# Patient Record
Sex: FEMALE | Race: WHITE | NOT HISPANIC OR LATINO | Employment: OTHER | URBAN - METROPOLITAN AREA
[De-identification: names, ages, dates, MRNs, and addresses within clinical notes are randomized per-mention and may not be internally consistent; named-entity substitution may affect disease eponyms.]

---

## 2017-10-25 ENCOUNTER — TRANSCRIBE ORDERS (OUTPATIENT)
Dept: ADMINISTRATIVE | Facility: HOSPITAL | Age: 67
End: 2017-10-25

## 2017-10-25 DIAGNOSIS — Z12.39 BREAST SCREENING: Primary | ICD-10-CM

## 2017-11-17 ENCOUNTER — HOSPITAL ENCOUNTER (OUTPATIENT)
Dept: GENERAL RADIOLOGY | Facility: HOSPITAL | Age: 67
Discharge: HOME | End: 2017-11-17

## 2017-11-17 ENCOUNTER — HOSPITAL ENCOUNTER (OUTPATIENT)
Dept: MAMMOGRAPHY | Facility: HOSPITAL | Age: 67
Discharge: HOME | End: 2017-11-17
Admitting: FAMILY MEDICINE

## 2017-11-17 DIAGNOSIS — Z12.39 BREAST SCREENING: ICD-10-CM

## 2017-11-17 DIAGNOSIS — Z00.01 ENCOUNTER FOR GENERAL ADULT MEDICAL EXAMINATION WITH ABNORMAL FINDINGS: ICD-10-CM

## 2017-11-17 PROCEDURE — G0202 SCR MAMMO BI INCL CAD: HCPCS

## 2017-11-17 PROCEDURE — 71020 HC CHEST PA AND LATERAL: CPT

## 2018-03-30 ENCOUNTER — OFFICE VISIT (OUTPATIENT)
Dept: NEUROLOGY | Facility: CLINIC | Age: 68
End: 2018-03-30

## 2018-03-30 VITALS
SYSTOLIC BLOOD PRESSURE: 141 MMHG | OXYGEN SATURATION: 98 % | WEIGHT: 131 LBS | HEIGHT: 63 IN | BODY MASS INDEX: 23.21 KG/M2 | DIASTOLIC BLOOD PRESSURE: 88 MMHG | HEART RATE: 99 BPM

## 2018-03-30 DIAGNOSIS — G60.9 IDIOPATHIC PERIPHERAL NEUROPATHY: Primary | ICD-10-CM

## 2018-03-30 DIAGNOSIS — H91.92 HEARING LOSS OF LEFT EAR, UNSPECIFIED HEARING LOSS TYPE: ICD-10-CM

## 2018-03-30 DIAGNOSIS — R27.0 ATAXIA: ICD-10-CM

## 2018-03-30 DIAGNOSIS — R73.09 OTHER ABNORMAL GLUCOSE: ICD-10-CM

## 2018-03-30 PROBLEM — IMO0001 BLUES: Status: ACTIVE | Noted: 2018-03-30

## 2018-03-30 PROBLEM — R92.8 ABNORMAL FINDING ON MAMMOGRAPHY: Status: ACTIVE | Noted: 2018-03-30

## 2018-03-30 PROBLEM — G58.9 NERVE DISORDER: Status: ACTIVE | Noted: 2018-03-30

## 2018-03-30 PROBLEM — E78.5 HLD (HYPERLIPIDEMIA): Status: ACTIVE | Noted: 2018-03-30

## 2018-03-30 PROBLEM — M19.90 ARTHRITIS: Status: ACTIVE | Noted: 2018-03-30

## 2018-03-30 PROCEDURE — 99205 OFFICE O/P NEW HI 60 MIN: CPT | Performed by: PSYCHIATRY & NEUROLOGY

## 2018-03-30 RX ORDER — TRAZODONE HYDROCHLORIDE 100 MG/1
100 TABLET ORAL NIGHTLY
COMMUNITY

## 2018-03-30 RX ORDER — MORPHINE SULFATE 15 MG/1
15 TABLET ORAL EVERY 4 HOURS PRN
COMMUNITY

## 2018-03-30 RX ORDER — FEXOFENADINE HCL AND PSEUDOEPHEDRINE HCI 180; 240 MG/1; MG/1
TABLET, EXTENDED RELEASE ORAL
COMMUNITY
Start: 2014-07-29

## 2018-03-30 NOTE — PROGRESS NOTES
CC:CMT    HPI:  Sia Rodgers is a  67 y.o.  right-handed white female who was sent for neurologic consultation by Dr. Sellers regarding CMT.  She states that she was told since she was a child because of the way her feet look that she had CMT.  Her father and grandfather and paternal uncle were all diagnosed in some way with CMT.  She is not sure how they were diagnosed if it was by physical exam or any other tests were done.  She states that she had an EMG when evaluating her carpal tunnel syndrome.  It was done a long time ago back in the 1990s.  The report is not available.  She states that her daughter also has and she had an evaluation in [place] but that evaluation has not yet been available to her.  She describes high arches and development of hammertoes.  She also describes a history of a right first CMC repair but no history of carpal tunnel releases.  She has history of major scoliosis of the lumbar and thoracic spine and she has had 5 major spinal operations including multilevel chaka placements in the thoracic spine and an unusual procedure in the lumbar spine with cords instead of rods.  She states that she no longer has substantial pain in the legs from the low back that she had before surgery.    She has osteoarthritis.  She sees Dr. Robbie Aleman and extensive testing apparently did not disclose an inflammatory arthritic condition.    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  The toe might get slightly pale.    As an additional symptom she describes some balance trouble without dizziness.  This has developed over time.  She also has had loss of hearing in the left ear.  This has not been investigated.  She does have some  bladder symptoms and she calls his old lady bladder where she has urinary frequency and some retention possibly a little bit of leaking when coughing.    Past Medical History:   Diagnosis Date   • Depression    • Low back pain    • Neck pain    • Osteoarthritis          Past Surgical History:   Procedure Laterality Date   • APPENDECTOMY     • BACK SURGERY     • BREAST BIOPSY     • EPIDURAL BLOCK     • HAND SURGERY     • TONSILLECTOMY             Current Outpatient Prescriptions:   •  amphetamine-dextroamphetamine (ADDERALL) 15 MG tablet, , Disp: , Rfl:   •  amphetamine-dextroamphetamine (ADDERALL) 30 MG tablet, , Disp: , Rfl:   •  baclofen (LIORESAL) 10 MG tablet, TAKE ONE (1) TABLET BY MOUTH UP TO THREE TIMES DAILY AS NEEDED, Disp: , Rfl: 0  •  buPROPion XL (WELLBUTRIN XL) 300 MG 24 hr tablet, , Disp: , Rfl:   •  Calcium-Magnesium-Vitamin D (CITRACAL CALCIUM+D PO), Take 1,200 mg by mouth Daily., Disp: , Rfl:   •  celecoxib (CeleBREX) 200 MG capsule, TAKE ONE (1) CAPSULE BY MOUTH EVERY DAY, Disp: , Rfl: 1  •  fexofenadine-pseudoephedrine (ALLEGRA-D 24 HOUR) 180-240 MG per 24 hr tablet, Take  by mouth., Disp: , Rfl:   •  fluticasone (CUTIVATE) 0.05 % cream, , Disp: , Rfl:   •  Morphine (MSIR) 15 MG tablet, Take 15 mg by mouth Every 4 (Four) Hours As Needed for Severe Pain ., Disp: , Rfl:   •  OXYCONTIN 60 MG 12 hr tablet, TAKE TWO (2) TABLETS BY MOUTH EVERY MORNING AND ONE (1) EVERY EVENING, Disp: , Rfl: 0  •  simvastatin (ZOCOR) 40 MG tablet, , Disp: , Rfl:   •  SPIRIVA RESPIMAT 1.25 MCG/ACT aerosol solution, , Disp: , Rfl:   •  traZODone (DESYREL) 100 MG tablet, Take 100 mg by mouth Every Night., Disp: , Rfl:       Family History   Problem Relation Age of Onset   • Dementia Mother    • Arthritis Mother    • Alzheimer's disease Father    • Dementia Father    • No Known Problems Sister    • No Known Problems Brother    • Migraines Daughter    • No Known Problems Son    • Arthritis Maternal Grandmother    • No Known  Problems Paternal Grandmother    • No Known Problems Maternal Aunt    • No Known Problems Paternal Aunt    • BRCA 1/2 Neg Hx    • Breast cancer Neg Hx    • Colon cancer Neg Hx    • Endometrial cancer Neg Hx    • Ovarian cancer Neg Hx          Social History     Social History   • Marital status: Unknown     Spouse name: N/A   • Number of children: N/A   • Years of education: N/A     Occupational History   • Not on file.     Social History Main Topics   • Smoking status: Current Every Day Smoker     Packs/day: 0.50   • Smokeless tobacco: Never Used   • Alcohol use 0.6 oz/week     1 Shots of liquor per week      Comment: socially   • Drug use: No   • Sexual activity: Not on file     Other Topics Concern   • Not on file     Social History Narrative   • No narrative on file         No Known Allergies      Pain Scale:4/10        ROS:  Review of Systems   Constitutional: Positive for fatigue. Negative for activity change, appetite change, chills, diaphoresis, fever and unexpected weight change.   HENT: Positive for congestion, hearing loss and sinus pressure. Negative for drooling, tinnitus, trouble swallowing and voice change.    Eyes: Positive for pain (dry eyes, both eyes), redness (dry eyes, both eyes) and visual disturbance (strabismus). Negative for photophobia.   Respiratory: Positive for shortness of breath. Negative for chest tightness.    Cardiovascular: Positive for leg swelling (both legs). Negative for chest pain and palpitations.   Gastrointestinal: Negative for blood in stool and vomiting.   Endocrine: Negative for cold intolerance and heat intolerance.   Genitourinary: Positive for difficulty urinating and urgency.   Musculoskeletal: Positive for back pain, gait problem, joint swelling, neck pain and neck stiffness.   Skin: Negative for rash.   Neurological: Positive for dizziness, tremors (hands shake) and numbness. Negative for seizures, syncope, facial asymmetry, speech difficulty, weakness,  "light-headedness and headaches.   Hematological: Does not bruise/bleed easily.   Psychiatric/Behavioral: Positive for behavioral problems (depression). Negative for confusion, hallucinations, sleep disturbance and suicidal ideas. The patient is not nervous/anxious.            Physical Exam:  Vitals:    03/30/18 1302   BP: 141/88   BP Location: Left arm   Patient Position: Sitting   Cuff Size: Adult   Pulse: 99   SpO2: 98%   Weight: 59.4 kg (131 lb)   Height: 160 cm (62.99\")     Body mass index is 23.21 kg/m².    Physical Exam  Gen.: Alert white female no acute distress  HEENT: Normocephalic no evidence of trauma.  Discs flat.  No A-V nicking.  Throat negative.  Neck: Supple.  No thyromegaly.  No cervical bruits.  Radial pulses were strong and simultaneous.  Heart: Regular rate and rhythm without murmurs  Examination lower extremities show high arches and some hammertoes    Neurological Exam:   Mental status: Awake alert oriented and conversant without evidence of an affective disorder thought disorder delusions or hallucinations.  HCF: No aphasia or apraxia or dysarthria.  Recent and remote memory intact.  Knowledge of recent events intact.  Cranial nerves: 2-12 intact except noticeably reduced hearing in the left ear to finger rub which was absent  Motor: Normal tone in the arms and legs.  She has bilateral intrinsic hand weakness with significant atrophy of the right abductor pollicis brevis and milder atrophy of the left and of the interossei.  There was some question of finger and thumb extensor weakness bilaterally.  Remaining muscles tested in both arms were 5 over 5.  In the lower extremities there is mild weakness of the tibialis anterior muscles and toe extensors and to some degree the toe flexors.  Reflexes: Diffusely present including ankle jerks with downgoing toe signs however the right knee jerk appeared brisker than the left  Sensory: Patchy reduced light touch and pinprick in the feet.  Minimally so " in the hands to light touch but pinprick was sharp.  Vibration sense was probably reduced at the ankle with position sense intact in the great toes.  Cerebellar: Finger to nose rapid movement heel-to-shin was normal  Gait and Station: Casual walk and toe walk looked pretty normal.  She had a little trouble tandem walking but it wasn't bad.  She had a little trouble heel walking probably due to tibialis weakness no Romberg no drift        Results:      Lab Results   Component Value Date    GLUCOSE 92 08/08/2014    BUN 16 08/08/2014    CREATININE 0.67 08/08/2014    CO2 30 (H) 08/08/2014    CALCIUM 9.6 08/08/2014       Lab Results   Component Value Date    WBC 6.98 08/08/2014    HGB 15.1 08/08/2014    HCT 45.1 08/08/2014    MCV 91.5 08/08/2014     08/08/2014         .No results found for: RPR      No results found for: TSH, S5JUXSQ, T8YQWYP, THYROIDAB      No results found for: CABNDVFB50      No results found for: FOLATE      No results found for: HGBA1C      Prior x-rays of the thoracic and lumbar spine which she brought on film and I reviewed.  She has severe scoliosis with multilevel chaka placement in the thoracic spine and pedicle screws in the lumbar spine without connections radiographically.  She states that there are cords holding these together.  Prior x-rays of the cervical spine were also reviewed on film showing multilevel severe degenerative disc disease with spurring      Assessment:   1.  Moderate to severe peripheral neuropathy.  Based on the patient's family history of most likely represents a form of Charcot-Gabby-Tooth disease.  2.  Severe scoliosis status post 5 thoracic/ lumbar operations  3.  Severe degenerative disc disease of the spine and prominent osteoarthritis multiple joints  4.  Some reflex asymmetry in the knees without any additional features to suggest a cause.  No clear evidence on exam for a myelopathy  5.  Complaints of gait unsteadiness with progressive hearing loss in the  left ear-may be independent but an acoustic neuroma should be excluded    Plan:  1.  I have asked her to obtain records of her daughter that demonstrate that she has CMT  2.  Labs to screen for common treatable causes of peripheral neuropathy: ESR, RPR, B12 and folate, thyroid functions, SPEP and IEP, cryoglobulins, heavy metals, copper level  III.  Will ask for help looking for old EMG from about 1995 which she states was done here.  If not helpful the option is to repeat her EMG  4.  Follow-up in about a month with me or Inocente Haddad NP  5.  MRI of the brain without and with contrast looking for an acoustic neuroma left ear        At least 30 minutes of this 60 minute consult were spent counseling the patient on multiple diagnoses and evaluations thereof      4/13/18  The patient calls with information on her daughters hereditary neuropathy.  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  The patient would like to be tested.  Order for HNPP panel placed.  Gns          Dictated utilizing Dragon dictation.

## 2018-04-13 ENCOUNTER — HOSPITAL ENCOUNTER (OUTPATIENT)
Dept: MRI IMAGING | Facility: HOSPITAL | Age: 68
Discharge: HOME | End: 2018-04-13
Attending: PSYCHIATRY & NEUROLOGY | Admitting: PSYCHIATRY & NEUROLOGY

## 2018-04-13 DIAGNOSIS — R27.0 ATAXIA: ICD-10-CM

## 2018-04-13 DIAGNOSIS — G60.9 IDIOPATHIC PERIPHERAL NEUROPATHY: Primary | ICD-10-CM

## 2018-04-13 PROCEDURE — 70553 MRI BRAIN STEM W/O & W/DYE: CPT

## 2018-04-13 PROCEDURE — 82565 ASSAY OF CREATININE: CPT

## 2018-04-13 PROCEDURE — A9577 INJ MULTIHANCE: HCPCS | Performed by: PSYCHIATRY & NEUROLOGY

## 2018-04-13 PROCEDURE — 0 GADOBENATE DIMEGLUMINE 529 MG/ML SOLUTION: Performed by: PSYCHIATRY & NEUROLOGY

## 2018-04-13 RX ADMIN — GADOBENATE DIMEGLUMINE 11 ML: 529 INJECTION, SOLUTION INTRAVENOUS at 16:00

## 2018-04-14 LAB — CREAT BLDA-MCNC: 0.7 MG/DL (ref 0.6–1.3)

## 2018-04-23 ENCOUNTER — TELEPHONE (OUTPATIENT)
Dept: NEUROLOGY | Facility: CLINIC | Age: 68
End: 2018-04-23

## 2018-04-23 NOTE — TELEPHONE ENCOUNTER
----- Message from Arvind Garnett MD sent at 4/21/2018  2:30 PM EDT -----  Contact: [phone]  I placed the lab for HNPP when I saw this a few days ago.    gns  ----- Message -----  From: Shanika Sorensen MA  Sent: 4/20/2018   5:12 PM  To: Arvind Garnett MD        ----- Message -----  From: Tone Jamil  Sent: 4/18/2018   3:12 PM  To: Shanika Sorensen MA    Pt stated that she called last week about having an additional lab drawn? She was told she would get a call the next day but she has not heard anything. I asked her what test it was and she could not remember

## 2018-04-25 ENCOUNTER — TRANSCRIBE ORDERS (OUTPATIENT)
Dept: ADMINISTRATIVE | Facility: HOSPITAL | Age: 68
End: 2018-04-25

## 2018-04-25 DIAGNOSIS — N39.41 URGE INCONTINENCE: Primary | ICD-10-CM

## 2018-04-30 ENCOUNTER — APPOINTMENT (OUTPATIENT)
Dept: LAB | Facility: HOSPITAL | Age: 68
End: 2018-04-30

## 2018-04-30 ENCOUNTER — HOSPITAL ENCOUNTER (OUTPATIENT)
Dept: ULTRASOUND IMAGING | Facility: HOSPITAL | Age: 68
Discharge: HOME | End: 2018-04-30
Attending: UROLOGY | Admitting: UROLOGY

## 2018-04-30 DIAGNOSIS — N39.41 URGE INCONTINENCE: ICD-10-CM

## 2018-04-30 LAB
ERYTHROCYTE [SEDIMENTATION RATE] IN BLOOD: 4 MM/HR (ref 0–30)
FOLATE SERPL-MCNC: >20 NG/ML (ref 4.78–24.2)
HBA1C MFR BLD: 5.5 % (ref 4.8–5.6)
T4 FREE SERPL-MCNC: 1.47 NG/DL (ref 0.93–1.7)
TSH SERPL DL<=0.05 MIU/L-ACNC: 3.81 MIU/ML (ref 0.27–4.2)
VIT B12 BLD-MCNC: 375 PG/ML (ref 211–946)

## 2018-04-30 PROCEDURE — 82175 ASSAY OF ARSENIC: CPT | Performed by: PSYCHIATRY & NEUROLOGY

## 2018-04-30 PROCEDURE — 83036 HEMOGLOBIN GLYCOSYLATED A1C: CPT | Performed by: PSYCHIATRY & NEUROLOGY

## 2018-04-30 PROCEDURE — 86592 SYPHILIS TEST NON-TREP QUAL: CPT | Performed by: PSYCHIATRY & NEUROLOGY

## 2018-04-30 PROCEDURE — 82607 VITAMIN B-12: CPT | Performed by: PSYCHIATRY & NEUROLOGY

## 2018-04-30 PROCEDURE — 83655 ASSAY OF LEAD: CPT | Performed by: PSYCHIATRY & NEUROLOGY

## 2018-04-30 PROCEDURE — 84165 PROTEIN E-PHORESIS SERUM: CPT | Performed by: PSYCHIATRY & NEUROLOGY

## 2018-04-30 PROCEDURE — 84443 ASSAY THYROID STIM HORMONE: CPT | Performed by: PSYCHIATRY & NEUROLOGY

## 2018-04-30 PROCEDURE — 76775 US EXAM ABDO BACK WALL LIM: CPT

## 2018-04-30 PROCEDURE — 85652 RBC SED RATE AUTOMATED: CPT | Performed by: PSYCHIATRY & NEUROLOGY

## 2018-04-30 PROCEDURE — 82746 ASSAY OF FOLIC ACID SERUM: CPT | Performed by: PSYCHIATRY & NEUROLOGY

## 2018-04-30 PROCEDURE — 36415 COLL VENOUS BLD VENIPUNCTURE: CPT | Performed by: PSYCHIATRY & NEUROLOGY

## 2018-04-30 PROCEDURE — 84439 ASSAY OF FREE THYROXINE: CPT | Performed by: PSYCHIATRY & NEUROLOGY

## 2018-04-30 PROCEDURE — 83825 ASSAY OF MERCURY: CPT | Performed by: PSYCHIATRY & NEUROLOGY

## 2018-04-30 PROCEDURE — 84155 ASSAY OF PROTEIN SERUM: CPT | Performed by: PSYCHIATRY & NEUROLOGY

## 2018-05-01 LAB — RPR SER QL: NORMAL

## 2018-05-02 LAB
ALBUMIN SERPL-MCNC: 4.1 G/DL (ref 2.9–4.4)
ALBUMIN/GLOB SERPL: 2 {RATIO} (ref 0.7–1.7)
ALPHA1 GLOB FLD ELPH-MCNC: 0.2 G/DL (ref 0–0.4)
ALPHA2 GLOB SERPL ELPH-MCNC: 0.6 G/DL (ref 0.4–1)
B-GLOBULIN SERPL ELPH-MCNC: 0.8 G/DL (ref 0.7–1.3)
GAMMA GLOB SERPL ELPH-MCNC: 0.5 G/DL (ref 0.4–1.8)
GLOBULIN SER CALC-MCNC: 2.1 G/DL (ref 2.2–3.9)
Lab: ABNORMAL
M-SPIKE: ABNORMAL G/DL
PROT PATTERN SERPL ELPH-IMP: ABNORMAL
PROT SERPL-MCNC: 6.2 G/DL (ref 6–8.5)

## 2018-05-03 LAB
ARSENIC BLD-MCNC: 6 UG/L (ref 2–23)
LEAD BLD-MCNC: 1 UG/DL (ref 0–19)
MERCURY BLD-MCNC: NORMAL UG/L (ref 0–14.9)

## 2018-05-08 ENCOUNTER — OFFICE VISIT (OUTPATIENT)
Dept: NEUROLOGY | Facility: CLINIC | Age: 68
End: 2018-05-08

## 2018-05-08 VITALS — WEIGHT: 131 LBS | HEIGHT: 63 IN | BODY MASS INDEX: 23.21 KG/M2

## 2018-05-08 DIAGNOSIS — G60.9 IDIOPATHIC PERIPHERAL NEUROPATHY: Primary | ICD-10-CM

## 2018-05-08 PROCEDURE — 99215 OFFICE O/P EST HI 40 MIN: CPT | Performed by: PSYCHIATRY & NEUROLOGY

## 2018-05-08 RX ORDER — OXYCODONE HYDROCHLORIDE 30 MG/1
TABLET ORAL
COMMUNITY
Start: 2018-04-28

## 2018-05-08 RX ORDER — CYCLOSPORINE 0.5 MG/ML
EMULSION OPHTHALMIC
COMMUNITY
Start: 2018-04-25

## 2018-05-08 RX ORDER — ALBUTEROL SULFATE 90 UG/1
AEROSOL, METERED RESPIRATORY (INHALATION)
COMMUNITY
Start: 2018-04-12

## 2018-05-08 RX ORDER — OXYBUTYNIN CHLORIDE 5 MG/1
TABLET ORAL
COMMUNITY
Start: 2018-04-19

## 2018-05-08 NOTE — PROGRESS NOTES
CC: Peripheral neuropathy and possible HNPP; progressive hearing loss left ear    HPI:  Sia Rodgers is a  67 y.o.  right-handed white female who I am seeing in follow-up regarding evaluation for peripheral neuropathy and progressive hearing loss in the left ear.  The patient's daughter had been evaluated for peripheral neuropathy and found to have HNPP.  Laboratory investigation has shown normal labs for common treatable causes which included normal sedimentation rate, RPR, thyroid functions, B12 and folate, heavy metals, SPEP and IEP and cryoglobulins.  A genetic test for HNPP has been sent off but is not available yet.    Regarding her evaluation of hearing loss in the left ear with some balance trouble and MRI of the brain was obtained looking for a left acoustic neuroma.  I reviewed the films and she does not have any lesion of the left cerebellopontine angle.  There is no origin for hearing loss identified on the MRI of the brain.  She has minor atrophy and small vessel changes consistent with age.    She describes no specific worsening of any of her symptoms.  She is simply here on follow-up of these tests.            Past Medical History:   Diagnosis Date   • Depression    • Low back pain    • Neck pain    • Osteoarthritis          Past Surgical History:   Procedure Laterality Date   • APPENDECTOMY     • BACK SURGERY     • BREAST BIOPSY     • EPIDURAL BLOCK     • HAND SURGERY     • TONSILLECTOMY             Current Outpatient Prescriptions:   •  amphetamine-dextroamphetamine (ADDERALL) 15 MG tablet, , Disp: , Rfl:   •  amphetamine-dextroamphetamine (ADDERALL) 30 MG tablet, , Disp: , Rfl:   •  baclofen (LIORESAL) 10 MG tablet, TAKE ONE (1) TABLET BY MOUTH UP TO THREE TIMES DAILY AS NEEDED, Disp: , Rfl: 0  •  buPROPion XL (WELLBUTRIN XL) 300 MG 24 hr tablet, , Disp: , Rfl:   •  Calcium-Magnesium-Vitamin D (CITRACAL CALCIUM+D PO), Take 1,200 mg by mouth Daily., Disp: , Rfl:   •  celecoxib (CeleBREX) 200 MG  capsule, TAKE ONE (1) CAPSULE BY MOUTH EVERY DAY, Disp: , Rfl: 1  •  fexofenadine-pseudoephedrine (ALLEGRA-D 24 HOUR) 180-240 MG per 24 hr tablet, Take  by mouth., Disp: , Rfl:   •  simvastatin (ZOCOR) 40 MG tablet, , Disp: , Rfl:   •  SPIRIVA RESPIMAT 1.25 MCG/ACT aerosol solution, , Disp: , Rfl:   •  traZODone (DESYREL) 100 MG tablet, Take 100 mg by mouth Every Night., Disp: , Rfl:   •  fluticasone (CUTIVATE) 0.05 % cream, , Disp: , Rfl:   •  Morphine (MSIR) 15 MG tablet, Take 15 mg by mouth Every 4 (Four) Hours As Needed for Severe Pain ., Disp: , Rfl:   •  OXYCONTIN 60 MG 12 hr tablet, TAKE TWO (2) TABLETS BY MOUTH EVERY MORNING AND ONE (1) EVERY EVENING, Disp: , Rfl: 0      Family History   Problem Relation Age of Onset   • Dementia Mother    • Arthritis Mother    • Alzheimer's disease Father    • Dementia Father    • No Known Problems Sister    • No Known Problems Brother    • Migraines Daughter    • No Known Problems Son    • Arthritis Maternal Grandmother    • No Known Problems Paternal Grandmother    • No Known Problems Maternal Aunt    • No Known Problems Paternal Aunt    • BRCA 1/2 Neg Hx    • Breast cancer Neg Hx    • Colon cancer Neg Hx    • Endometrial cancer Neg Hx    • Ovarian cancer Neg Hx          Social History     Social History   • Marital status:      Spouse name: N/A   • Number of children: N/A   • Years of education: N/A     Occupational History   • Not on file.     Social History Main Topics   • Smoking status: Current Every Day Smoker     Packs/day: 0.50   • Smokeless tobacco: Never Used   • Alcohol use 0.6 oz/week     1 Shots of liquor per week      Comment: socially   • Drug use: No   • Sexual activity: Not on file     Other Topics Concern   • Not on file     Social History Narrative   • No narrative on file         No Known Allergies      Pain Scale:0/10        ROS:  Review of Systems   Constitutional: Negative for activity change, appetite change and fatigue.   HENT: Negative for  "ear pain, facial swelling and hearing loss.    Eyes: Negative for pain, redness and itching.   Respiratory: Positive for shortness of breath. Negative for cough and choking.    Cardiovascular: Negative for chest pain and leg swelling.   Gastrointestinal: Negative for abdominal pain, nausea and vomiting.   Endocrine: Negative for cold intolerance and heat intolerance.   Musculoskeletal: Positive for gait problem.   Skin: Negative for color change, pallor, rash and wound.   Allergic/Immunologic: Negative for environmental allergies and food allergies.   Neurological: Positive for dizziness, tremors, weakness and numbness. Negative for seizures, syncope, facial asymmetry, speech difficulty, light-headedness and headaches.   Hematological: Negative for adenopathy. Does not bruise/bleed easily.   Psychiatric/Behavioral: Negative for agitation, behavioral problems, confusion, decreased concentration, dysphoric mood, hallucinations, self-injury, sleep disturbance and suicidal ideas. The patient is not nervous/anxious and is not hyperactive.            Physical Exam:  Vitals:    05/08/18 1052   Weight: 59.4 kg (131 lb)   Height: 160 cm (62.99\")     Body mass index is 23.21 kg/m².    Physical Exam  Gen.: Slender white female no acute distress  HEENT: Normocephalic no evidence of trauma.  Neck: Supple but with reduced range of motion due to arthritis.  Heart: Regular rate and rhythm      Neurological Exam:   Mental status: Awake alert oriented conversant provides good history without evidence of an affective disorder thought disorder delusions or hallucinations.  HCF: No aphasia or apraxia or dysarthria.  Recent and remote memory intact.  Knowledge of recent events intact.  Cranial nerves II-12 intact  Motor there is mild weakness of intrinsic hand muscles and toe extension and flexion.  Tibialis anterior may be slightly weak also on both sides.  Reflexes are +1.  Cerebellar: Finger to nose rapid movements are intact  Gait " and Station: Mildly broad-based        Results:      Lab Results   Component Value Date    GLUCOSE 92 08/08/2014    BUN 16 08/08/2014    CREATININE 0.70 04/13/2018    CO2 30 (H) 08/08/2014    CALCIUM 9.6 08/08/2014    PROTENTOTREF 6.2 04/30/2018    ALBUMIN 4.1 04/30/2018    LABIL2 2.0 (H) 04/30/2018       Lab Results   Component Value Date    WBC 6.98 08/08/2014    HGB 15.1 08/08/2014    HCT 45.1 08/08/2014    MCV 91.5 08/08/2014     08/08/2014         .  Lab Results   Component Value Date    RPR Non-Reactive 04/30/2018         Lab Results   Component Value Date    TSH 3.810 04/30/2018         Lab Results   Component Value Date    [id number] 375 04/30/2018         Lab Results   Component Value Date    FOLATE >20.00 04/30/2018         Lab Results   Component Value Date    HGBA1C 5.50 04/30/2018       MRI of the brain reviewed as noted above        Assessment:   1.  Peripheral neuropathy most likely hereditary and most likely hereditary neuropathy with liability to pressure palsy.  2.  Progressive hearing loss left ear-no abnormality on MRI to explain it.  Specifically no mass lesion of the cerebellopontine angle or the internal acoustic canal.           Plan:  1.  Await results of HNPP test.  I will call her when this returns.  2.  Follow-up in office as needed.  She may wish to pursue a hearing aid if this bothers her enough.        At least 25 minutes of this 40 minute follow-up were spent counseling the patient on her diagnoses.  This included time for review of her MRI films and laboratory tests.                  Dictated utilizing Dragon dictation.

## 2019-02-13 ENCOUNTER — TELEPHONE (OUTPATIENT)
Dept: NEUROLOGY | Facility: CLINIC | Age: 69
End: 2019-02-13

## 2019-02-13 NOTE — TELEPHONE ENCOUNTER
----- Message from Luz Maria Schreiber RN sent at 2/12/2019  3:02 PM EST -----  Contact: Patient  Aileen,    This patient called requesting the results of the HNPP lab work that was sent last year.  From the last office note, the patient states that Dr. Garnett was going to call with the results however she has not heard anything about it since.    She is now newly concerned about it because her daughter is having nerve issues and is seeing genetic doctor who is urgently asking of the her daughter genetic history. In specifically the genetic doctor is asking the for the HNPP results.    Please call her at [phone].

## 2019-02-13 NOTE — TELEPHONE ENCOUNTER
S/w pt regarding HNPP Panel I informed her it doesn't look like the test was ever done. She insisted that it was and wanted to know why it didn't get done. I explained to her that would do not do the blood draws in our office and that maybe lab missed the test. She suppose to call back with a fax number so it can be sent to [facility].

## 2019-02-20 NOTE — TELEPHONE ENCOUNTER
Noted.    [facility] might do the test for less.  Jeanne knows more about how they get ordered    Gns

## 2019-06-17 ENCOUNTER — TRANSCRIBE ORDERS (OUTPATIENT)
Dept: ADMINISTRATIVE | Facility: HOSPITAL | Age: 69
End: 2019-06-17

## 2019-06-17 DIAGNOSIS — F17.200 NICOTINE DEPENDENCE WITH CURRENT USE: Primary | ICD-10-CM

## 2019-06-17 DIAGNOSIS — Z78.0 MENOPAUSE: Primary | ICD-10-CM

## 2019-06-17 DIAGNOSIS — Z12.31 SCREENING MAMMOGRAM, ENCOUNTER FOR: Primary | ICD-10-CM

## 2019-06-28 ENCOUNTER — TRANSCRIBE ORDERS (OUTPATIENT)
Dept: ADMINISTRATIVE | Facility: HOSPITAL | Age: 69
End: 2019-06-28

## 2019-06-28 DIAGNOSIS — F17.210 CIGARETTE SMOKER: Primary | ICD-10-CM

## 2019-09-23 ENCOUNTER — TELEPHONE (OUTPATIENT)
Dept: NEUROLOGY | Facility: CLINIC | Age: 69
End: 2019-09-23

## 2019-09-23 NOTE — TELEPHONE ENCOUNTER
----- Message from Lorenza Oviedo sent at 9/19/2019  2:29 PM EDT -----  Contact: [phone] CELL   Pt was ordered the HNPP test lab and she said the lab may have lost this or didn't draw for this she doesn't care if its covered through medicare or not she will pay she and her daughter are seeing a genetics dr around [place]. I have her scheduled 11/1 but she would need sooner cause it takes a few weeks to get the test back she reported she spoke with peace enriquez's ma numerous times and she wouldn't return her call and was sobbing and very frantic. I assured her I would let you know and try to help her or come up with a solution with you. Please advise . thanks

## 2019-09-23 NOTE — TELEPHONE ENCOUNTER
I spoke with Dr. Garnett about this situation. He would like to cancel the HNPP test. He is going to send in an order through [facility]. I spoke with Mrs. Rodgers she will come by the EP office to sign the form tomorrow.

## 2019-09-23 NOTE — TELEPHONE ENCOUNTER
I spoke with Mrs. Rodgers today to address her concerns. I spoke with [facility]. The lab was not drawn. I spoke with [facility].They said the results can take 21-28 days to get back. The lab goes through a 3rd party ([facility]). Rebeca from [facility] lab stated the had concerns with the billing on the HNPP lab. She will call over to the [facility] lab to get clarification. I will follow up with Mrs. Rodgers when I hear back from Rebeca. Mrs. Rodgers would like to have this lab drawn at [facility].     Rebeca spoke with [facility]. She recommend the pt call [facility] to discuss cost of testing. They gave her an estimated price of $2805.    I spoke with Mrs. Rodgers and informed her of the conversation above. She will call me tomorrow to update me.

## 2019-09-23 NOTE — TELEPHONE ENCOUNTER
I spoke with Salma Bliss practice manager.  We had ordered an HNPP blood draw 4/2018.  This was not done.  It is an expensive test.  In the interim [facility] has offered genetic testing which we will pursue which is a saliva test.  We will have her fill out the permission form and we will fax the request to [facility] who will provide her with a spit kit to collect her saliva and send to the company with results generally 3 to 4 weeks later    ISATU

## 2019-10-17 ENCOUNTER — APPOINTMENT (OUTPATIENT)
Dept: BONE DENSITY | Facility: HOSPITAL | Age: 69
End: 2019-10-17

## 2020-06-23 ENCOUNTER — TRANSCRIBE ORDERS (OUTPATIENT)
Dept: ADMINISTRATIVE | Facility: HOSPITAL | Age: 70
End: 2020-06-23

## 2020-06-23 DIAGNOSIS — Z12.31 VISIT FOR SCREENING MAMMOGRAM: ICD-10-CM

## 2020-06-23 DIAGNOSIS — Z78.0 POST-MENOPAUSAL: Primary | ICD-10-CM

## 2020-06-26 ENCOUNTER — TRANSCRIBE ORDERS (OUTPATIENT)
Dept: ADMINISTRATIVE | Facility: HOSPITAL | Age: 70
End: 2020-06-26

## 2020-06-26 DIAGNOSIS — Z12.2 ENCOUNTER FOR SCREENING FOR LUNG CANCER: Primary | ICD-10-CM

## 2020-06-26 DIAGNOSIS — R60.9 EDEMA, UNSPECIFIED TYPE: ICD-10-CM

## 2020-06-29 ENCOUNTER — TRANSCRIBE ORDERS (OUTPATIENT)
Dept: ADMINISTRATIVE | Facility: HOSPITAL | Age: 70
End: 2020-06-29

## 2020-06-29 DIAGNOSIS — R60.9 EDEMA, UNSPECIFIED TYPE: Primary | ICD-10-CM

## 2020-07-16 ENCOUNTER — APPOINTMENT (OUTPATIENT)
Dept: CARDIOLOGY | Facility: HOSPITAL | Age: 70
End: 2020-07-16

## 2020-07-16 ENCOUNTER — APPOINTMENT (OUTPATIENT)
Dept: CT IMAGING | Facility: HOSPITAL | Age: 70
End: 2020-07-16

## 2020-07-23 ENCOUNTER — HOSPITAL ENCOUNTER (OUTPATIENT)
Dept: CT IMAGING | Facility: HOSPITAL | Age: 70
Discharge: HOME | End: 2020-07-23

## 2020-07-23 ENCOUNTER — HOSPITAL ENCOUNTER (OUTPATIENT)
Dept: CARDIOLOGY | Facility: HOSPITAL | Age: 70
Discharge: HOME | End: 2020-07-23
Admitting: FAMILY MEDICINE

## 2020-07-23 DIAGNOSIS — Z12.2 ENCOUNTER FOR SCREENING FOR LUNG CANCER: ICD-10-CM

## 2020-07-23 DIAGNOSIS — R60.9 EDEMA, UNSPECIFIED TYPE: ICD-10-CM

## 2020-07-23 LAB
BH CV LOWER VASCULAR LEFT COMMON FEMORAL AUGMENT: NORMAL
BH CV LOWER VASCULAR LEFT COMMON FEMORAL COMPETENT: NORMAL
BH CV LOWER VASCULAR LEFT COMMON FEMORAL COMPRESS: NORMAL
BH CV LOWER VASCULAR LEFT COMMON FEMORAL PHASIC: NORMAL
BH CV LOWER VASCULAR LEFT COMMON FEMORAL SPONT: NORMAL
BH CV LOWER VASCULAR LEFT DISTAL FEMORAL COMPRESS: NORMAL
BH CV LOWER VASCULAR LEFT GASTRONEMIUS COMPRESS: NORMAL
BH CV LOWER VASCULAR LEFT GREATER SAPH AK COMPRESS: NORMAL
BH CV LOWER VASCULAR LEFT GREATER SAPH BK COMPRESS: NORMAL
BH CV LOWER VASCULAR LEFT LESSER SAPH COMPRESS: NORMAL
BH CV LOWER VASCULAR LEFT MID FEMORAL AUGMENT: NORMAL
BH CV LOWER VASCULAR LEFT MID FEMORAL COMPETENT: NORMAL
BH CV LOWER VASCULAR LEFT MID FEMORAL COMPRESS: NORMAL
BH CV LOWER VASCULAR LEFT MID FEMORAL PHASIC: NORMAL
BH CV LOWER VASCULAR LEFT MID FEMORAL SPONT: NORMAL
BH CV LOWER VASCULAR LEFT PERONEAL COMPRESS: NORMAL
BH CV LOWER VASCULAR LEFT POPLITEAL AUGMENT: NORMAL
BH CV LOWER VASCULAR LEFT POPLITEAL COMPETENT: NORMAL
BH CV LOWER VASCULAR LEFT POPLITEAL COMPRESS: NORMAL
BH CV LOWER VASCULAR LEFT POPLITEAL PHASIC: NORMAL
BH CV LOWER VASCULAR LEFT POPLITEAL SPONT: NORMAL
BH CV LOWER VASCULAR LEFT POSTERIOR TIBIAL COMPRESS: NORMAL
BH CV LOWER VASCULAR LEFT PROFUNDA FEMORAL COMPRESS: NORMAL
BH CV LOWER VASCULAR LEFT PROXIMAL FEMORAL COMPRESS: NORMAL
BH CV LOWER VASCULAR LEFT SAPHENOFEMORAL JUNCTION COMPRESS: NORMAL
BH CV LOWER VASCULAR LEFT SOLEAL COMPRESS: NORMAL
BH CV LOWER VASCULAR RIGHT COMMON FEMORAL AUGMENT: NORMAL
BH CV LOWER VASCULAR RIGHT COMMON FEMORAL COMPETENT: NORMAL
BH CV LOWER VASCULAR RIGHT COMMON FEMORAL COMPRESS: NORMAL
BH CV LOWER VASCULAR RIGHT COMMON FEMORAL PHASIC: NORMAL
BH CV LOWER VASCULAR RIGHT COMMON FEMORAL SPONT: NORMAL

## 2020-07-23 PROCEDURE — G0297 LDCT FOR LUNG CA SCREEN: HCPCS

## 2020-07-23 PROCEDURE — 93971 EXTREMITY STUDY: CPT

## 2020-08-31 ENCOUNTER — APPOINTMENT (OUTPATIENT)
Dept: MAMMOGRAPHY | Facility: HOSPITAL | Age: 70
End: 2020-08-31

## 2020-08-31 ENCOUNTER — APPOINTMENT (OUTPATIENT)
Dept: BONE DENSITY | Facility: HOSPITAL | Age: 70
End: 2020-08-31

## 2020-11-24 ENCOUNTER — HOSPITAL ENCOUNTER (OUTPATIENT)
Dept: BONE DENSITY | Facility: HOSPITAL | Age: 70
Discharge: HOME | End: 2020-11-24

## 2020-11-24 ENCOUNTER — HOSPITAL ENCOUNTER (OUTPATIENT)
Dept: MAMMOGRAPHY | Facility: HOSPITAL | Age: 70
Discharge: HOME | End: 2020-11-24

## 2020-11-24 DIAGNOSIS — Z12.31 VISIT FOR SCREENING MAMMOGRAM: ICD-10-CM

## 2020-11-24 DIAGNOSIS — Z78.0 POST-MENOPAUSAL: ICD-10-CM

## 2020-11-24 PROCEDURE — 77080 DXA BONE DENSITY AXIAL: CPT

## 2020-11-24 PROCEDURE — 77067 SCR MAMMO BI INCL CAD: CPT

## 2020-11-24 PROCEDURE — 77063 BREAST TOMOSYNTHESIS BI: CPT

## 2021-03-19 ENCOUNTER — BULK ORDERING (OUTPATIENT)
Dept: CASE MANAGEMENT | Facility: OTHER | Age: 71
End: 2021-03-19

## 2021-03-19 DIAGNOSIS — Z23 IMMUNIZATION DUE: ICD-10-CM

## 2021-06-25 ENCOUNTER — TRANSCRIBE ORDERS (OUTPATIENT)
Dept: ADMINISTRATIVE | Facility: HOSPITAL | Age: 71
End: 2021-06-25

## 2021-06-25 DIAGNOSIS — Z12.31 SCREENING MAMMOGRAM FOR HIGH-RISK PATIENT: Primary | ICD-10-CM

## 2021-06-25 DIAGNOSIS — Z12.2 ENCOUNTER FOR SCREENING FOR LUNG CANCER: Primary | ICD-10-CM

## 2022-08-18 ENCOUNTER — TRANSCRIBE ORDERS (OUTPATIENT)
Dept: ADMINISTRATIVE | Facility: HOSPITAL | Age: 72
End: 2022-08-18

## 2022-08-18 DIAGNOSIS — Z12.31 SCREENING MAMMOGRAM, ENCOUNTER FOR: Primary | ICD-10-CM

## 2022-08-18 DIAGNOSIS — Z78.0 POST-MENOPAUSAL: ICD-10-CM

## 2023-08-22 ENCOUNTER — TRANSCRIBE ORDERS (OUTPATIENT)
Dept: ADMINISTRATIVE | Facility: HOSPITAL | Age: 73
End: 2023-08-22
Payer: MEDICARE

## 2023-08-22 DIAGNOSIS — Z12.31 BREAST CANCER SCREENING BY MAMMOGRAM: Primary | ICD-10-CM

## 2023-08-22 DIAGNOSIS — Z12.2 SCREENING FOR LUNG CANCER: Primary | ICD-10-CM

## 2023-08-22 DIAGNOSIS — Z78.0 POST-MENOPAUSAL: ICD-10-CM

## 2024-02-27 ENCOUNTER — HOSPITAL ENCOUNTER (OUTPATIENT)
Dept: BONE DENSITY | Facility: HOSPITAL | Age: 74
Discharge: HOME | End: 2024-02-27
Payer: MEDICARE

## 2024-02-27 ENCOUNTER — HOSPITAL ENCOUNTER (OUTPATIENT)
Dept: MAMMOGRAPHY | Facility: HOSPITAL | Age: 74
Discharge: HOME | End: 2024-02-27
Payer: MEDICARE

## 2024-02-27 DIAGNOSIS — Z12.31 BREAST CANCER SCREENING BY MAMMOGRAM: ICD-10-CM

## 2024-02-27 DIAGNOSIS — Z78.0 POST-MENOPAUSAL: ICD-10-CM

## 2024-02-27 PROCEDURE — 77063 BREAST TOMOSYNTHESIS BI: CPT

## 2024-02-27 PROCEDURE — 77067 SCR MAMMO BI INCL CAD: CPT

## 2024-02-27 PROCEDURE — 77080 DXA BONE DENSITY AXIAL: CPT
